# Patient Record
Sex: FEMALE | Race: WHITE | NOT HISPANIC OR LATINO | URBAN - METROPOLITAN AREA
[De-identification: names, ages, dates, MRNs, and addresses within clinical notes are randomized per-mention and may not be internally consistent; named-entity substitution may affect disease eponyms.]

---

## 2020-10-01 ENCOUNTER — OFFICE VISIT (OUTPATIENT)
Dept: URBAN - METROPOLITAN AREA CLINIC 121 | Facility: CLINIC | Age: 25
End: 2020-10-01

## 2021-06-09 ENCOUNTER — OFFICE VISIT (OUTPATIENT)
Dept: URBAN - METROPOLITAN AREA CLINIC 63 | Facility: CLINIC | Age: 26
End: 2021-06-09

## 2021-06-09 ENCOUNTER — OFFICE VISIT (OUTPATIENT)
Dept: URBAN - METROPOLITAN AREA CLINIC 60 | Facility: CLINIC | Age: 26
End: 2021-06-09

## 2021-06-29 ENCOUNTER — OFFICE VISIT (OUTPATIENT)
Dept: URBAN - METROPOLITAN AREA CLINIC 63 | Facility: CLINIC | Age: 26
End: 2021-06-29

## 2021-08-30 ENCOUNTER — OFFICE VISIT (OUTPATIENT)
Dept: URBAN - METROPOLITAN AREA CLINIC 63 | Facility: CLINIC | Age: 26
End: 2021-08-30

## 2021-10-26 ENCOUNTER — OFFICE VISIT (OUTPATIENT)
Dept: URBAN - METROPOLITAN AREA CLINIC 121 | Facility: CLINIC | Age: 26
End: 2021-10-26

## 2021-11-01 ENCOUNTER — OFFICE VISIT (OUTPATIENT)
Dept: URBAN - METROPOLITAN AREA CLINIC 63 | Facility: CLINIC | Age: 26
End: 2021-11-01

## 2022-01-14 ENCOUNTER — OFFICE VISIT (OUTPATIENT)
Dept: URBAN - METROPOLITAN AREA MEDICAL CENTER 14 | Facility: MEDICAL CENTER | Age: 27
End: 2022-01-14

## 2022-01-28 ENCOUNTER — OFFICE VISIT (OUTPATIENT)
Dept: URBAN - METROPOLITAN AREA CLINIC 63 | Facility: CLINIC | Age: 27
End: 2022-01-28

## 2022-04-29 ENCOUNTER — OFFICE VISIT (OUTPATIENT)
Dept: URBAN - METROPOLITAN AREA CLINIC 63 | Facility: CLINIC | Age: 27
End: 2022-04-29

## 2022-07-05 ENCOUNTER — OFFICE VISIT (OUTPATIENT)
Dept: URBAN - METROPOLITAN AREA CLINIC 63 | Facility: CLINIC | Age: 27
End: 2022-07-05

## 2022-07-09 ENCOUNTER — TELEPHONE ENCOUNTER (OUTPATIENT)
Dept: URBAN - METROPOLITAN AREA CLINIC 121 | Facility: CLINIC | Age: 27
End: 2022-07-09

## 2022-07-09 RX ORDER — MESALAMINE 1.2 G/1
4 ONCE A DAY TABLET, DELAYED RELEASE ORAL
Refills: 1 | OUTPATIENT
Start: 2021-06-09 | End: 2022-01-28

## 2022-07-09 RX ORDER — USTEKINUMAB 90 MG/ML
INJECTION, SOLUTION SUBCUTANEOUS
Refills: 0 | OUTPATIENT
Start: 2022-01-28 | End: 2022-01-28

## 2022-07-09 RX ORDER — ONDANSETRON HYDROCHLORIDE 4 MG/1
USE AS DIRECTED TAKE 1 TABLET 30 MINUTES PRIOR TO EACH HALF OF COLONOSCOPY PREP TO PREVENT NAUSEA TABLET, FILM COATED ORAL
Refills: 0 | OUTPATIENT
Start: 2021-11-01 | End: 2022-01-28

## 2022-07-09 RX ORDER — PREDNISONE 5 MG/1
ONCE A DAY TABLET ORAL ONCE A DAY
Refills: 1 | OUTPATIENT
Start: 2021-06-09 | End: 2021-11-01

## 2022-07-09 RX ORDER — USTEKINUMAB 90 MG/ML
INJECTION, SOLUTION SUBCUTANEOUS
Refills: 0 | OUTPATIENT
Start: 2021-11-01 | End: 2022-01-28

## 2022-07-09 RX ORDER — MESALAMINE 1.2 G/1
4 ONCE A DAY TABLET, DELAYED RELEASE ORAL
Refills: 1 | OUTPATIENT
Start: 2021-11-01 | End: 2022-01-28

## 2022-07-09 RX ORDER — USTEKINUMAB 90 MG/ML
INJECTION, SOLUTION SUBCUTANEOUS
Refills: 0 | OUTPATIENT
Start: 2021-08-30 | End: 2021-08-30

## 2022-07-09 RX ORDER — MESALAMINE 400 MG/1
CAPSULE, DELAYED RELEASE ORAL
Refills: 0 | OUTPATIENT
Start: 2021-06-09 | End: 2021-06-29

## 2022-07-09 RX ORDER — USTEKINUMAB 90 MG/ML
INJECTION, SOLUTION SUBCUTANEOUS
Refills: 0 | OUTPATIENT
Start: 2021-08-30 | End: 2021-11-01

## 2022-07-10 ENCOUNTER — TELEPHONE ENCOUNTER (OUTPATIENT)
Dept: URBAN - METROPOLITAN AREA CLINIC 121 | Facility: CLINIC | Age: 27
End: 2022-07-10

## 2022-07-10 RX ORDER — USTEKINUMAB 90 MG/ML
INJ 1-90MG SQ EVERY 8 WEEKS INJECTION, SOLUTION SUBCUTANEOUS
Refills: 5 | Status: ACTIVE | COMMUNITY
Start: 2021-11-01

## 2022-07-18 ENCOUNTER — OFFICE VISIT (OUTPATIENT)
Dept: URBAN - METROPOLITAN AREA CLINIC 63 | Facility: CLINIC | Age: 27
End: 2022-07-18
Payer: COMMERCIAL

## 2022-07-18 VITALS
SYSTOLIC BLOOD PRESSURE: 102 MMHG | OXYGEN SATURATION: 96 % | BODY MASS INDEX: 18.76 KG/M2 | HEIGHT: 65 IN | WEIGHT: 112.6 LBS | HEART RATE: 66 BPM | TEMPERATURE: 97.9 F | DIASTOLIC BLOOD PRESSURE: 70 MMHG

## 2022-07-18 DIAGNOSIS — K51.011 ULCERATIVE PANCOLITIS WITH RECTAL BLEEDING: ICD-10-CM

## 2022-07-18 PROBLEM — 444548001: Status: ACTIVE | Noted: 2022-07-18

## 2022-07-18 PROCEDURE — 99213 OFFICE O/P EST LOW 20 MIN: CPT | Performed by: NURSE PRACTITIONER

## 2022-07-18 RX ORDER — USTEKINUMAB 90 MG/ML
INJ 1-90MG SQ EVERY 8 WEEKS INJECTION, SOLUTION SUBCUTANEOUS
Refills: 5 | Status: ACTIVE | COMMUNITY
Start: 2021-11-01

## 2022-07-18 NOTE — HPI-ULCERATIVE COLITIS
Doing great clinically on stelara. Asymptomatic. Interested in having another child  Last Colonoscopy: 01/2022 with minimal erythema in ascending colon, cryptitis in that limited area on path the rest of the colon was normal  Current IBD Meds: Stelara q8 weeks  Prior IBD Meds: prednisone and mesalamine  Steroid use/response: responded to prednisone after hospitalization in 2021  Most recent imaging: none  Extraintestinal manifestations: none  Inflammatory Markers: crp and calprotectin normal 10/2021, both very elevated at diagnosis  Required  biologic testing:  Risk factors for severe disease: young age at diagnosis, severe disease at diagnosis, extreme calpro/crp elevations at diagnosis  IBD surgical history: none  Personal history of cancer: none   Cardiac history: none

## 2022-10-10 ENCOUNTER — ERX REFILL RESPONSE (OUTPATIENT)
Dept: URBAN - METROPOLITAN AREA CLINIC 63 | Facility: CLINIC | Age: 27
End: 2022-10-10

## 2022-10-10 RX ORDER — USTEKINUMAB 90 MG/ML
INJECT 1 ML INTO THE SKIN EVERY 8 WEEKS INJECTION, SOLUTION SUBCUTANEOUS
Qty: 1 MILLILITER | Refills: 6 | OUTPATIENT

## 2023-03-22 ENCOUNTER — TELEPHONE ENCOUNTER (OUTPATIENT)
Dept: URBAN - METROPOLITAN AREA CLINIC 63 | Facility: CLINIC | Age: 28
End: 2023-03-22

## 2023-03-23 ENCOUNTER — OFFICE VISIT (OUTPATIENT)
Dept: URBAN - METROPOLITAN AREA CLINIC 60 | Facility: CLINIC | Age: 28
End: 2023-03-23
Payer: COMMERCIAL

## 2023-03-23 ENCOUNTER — WEB ENCOUNTER (OUTPATIENT)
Dept: URBAN - METROPOLITAN AREA CLINIC 60 | Facility: CLINIC | Age: 28
End: 2023-03-23

## 2023-03-23 ENCOUNTER — TELEPHONE ENCOUNTER (OUTPATIENT)
Dept: URBAN - METROPOLITAN AREA CLINIC 63 | Facility: CLINIC | Age: 28
End: 2023-03-23

## 2023-03-23 VITALS
SYSTOLIC BLOOD PRESSURE: 100 MMHG | HEIGHT: 65 IN | DIASTOLIC BLOOD PRESSURE: 70 MMHG | BODY MASS INDEX: 21.02 KG/M2 | WEIGHT: 126.2 LBS | OXYGEN SATURATION: 97 % | TEMPERATURE: 97.9 F | HEART RATE: 86 BPM

## 2023-03-23 DIAGNOSIS — Z3A.30 30 WEEKS GESTATION OF PREGNANCY: ICD-10-CM

## 2023-03-23 DIAGNOSIS — K51.011 ULCERATIVE PANCOLITIS WITH RECTAL BLEEDING: ICD-10-CM

## 2023-03-23 PROCEDURE — 99214 OFFICE O/P EST MOD 30 MIN: CPT | Performed by: NURSE PRACTITIONER

## 2023-03-23 RX ORDER — ESCITALOPRAM OXALATE 10 MG/1
1 TABLET TABLET, FILM COATED ORAL ONCE A DAY
Status: ACTIVE | COMMUNITY

## 2023-03-23 RX ORDER — BUDESONIDE 3 MG/1
3 ONCE A DAY CAPSULE, COATED PELLETS ORAL
Qty: 90 | Refills: 2 | OUTPATIENT
Start: 2023-03-23

## 2023-03-23 RX ORDER — USTEKINUMAB 90 MG/ML
INJECT 1 ML INTO THE SKIN EVERY 8 WEEKS INJECTION, SOLUTION SUBCUTANEOUS
Qty: 1 MILLILITER | Refills: 6 | Status: ACTIVE | COMMUNITY

## 2023-03-23 NOTE — HPI-TODAY'S VISIT:
30 weeks pregnant Having 3 weeks of progressively worsening diarrhea, bleeding and mucous. No abdominal pain. Started having symptoms 4 weeks after last stelara injection. Has another syringe at home. Labs and c diff to be done asap. Has done well on stelara for >18 months. Follows with [facility] and [facility].

## 2023-03-23 NOTE — HPI-ULCERATIVE COLITIS
Diagnosed after giving birth in 2021 with severe colitis  Last Colonoscopy: 01/2022 with minimal erythema in ascending colon, cryptitis in that limited area on path the rest of the colon was normal  Current IBD Meds: Stelara q8 weeks  Prior IBD Meds: prednisone and mesalamine  Steroid use/response: responded to prednisone after hospitalization in 2021  Most recent imaging: none  Extraintestinal manifestations: none  Inflammatory Markers: crp and calprotectin normal 10/2021, both very elevated at diagnosis  Required  biologic testing:  Risk factors for severe disease: young age at diagnosis, severe disease at diagnosis, extreme calpro/crp elevations at diagnosis  IBD surgical history: none  Personal history of cancer: none   Cardiac history: none

## 2023-03-28 ENCOUNTER — TELEPHONE ENCOUNTER (OUTPATIENT)
Dept: URBAN - METROPOLITAN AREA CLINIC 63 | Facility: CLINIC | Age: 28
End: 2023-03-28

## 2023-04-10 ENCOUNTER — TELEPHONE ENCOUNTER (OUTPATIENT)
Dept: URBAN - METROPOLITAN AREA CLINIC 63 | Facility: CLINIC | Age: 28
End: 2023-04-10

## 2023-04-10 RX ORDER — BUDESONIDE 28 MG/1
1 APPLICATION AEROSOL, FOAM RECTAL TWICE A DAY
Qty: 1 KIT | Refills: 1 | OUTPATIENT
Start: 2023-04-10 | End: 2023-05-08

## 2023-04-13 ENCOUNTER — TELEPHONE ENCOUNTER (OUTPATIENT)
Dept: URBAN - METROPOLITAN AREA CLINIC 64 | Facility: CLINIC | Age: 28
End: 2023-04-13

## 2023-04-21 ENCOUNTER — OFFICE VISIT (OUTPATIENT)
Dept: URBAN - METROPOLITAN AREA TELEHEALTH 1 | Facility: TELEHEALTH | Age: 28
End: 2023-04-21
Payer: COMMERCIAL

## 2023-04-21 DIAGNOSIS — K51.011 ULCERATIVE PANCOLITIS WITH RECTAL BLEEDING: ICD-10-CM

## 2023-04-21 DIAGNOSIS — Z3A.34 34 WEEKS GESTATION OF PREGNANCY: ICD-10-CM

## 2023-04-21 PROCEDURE — 99214 OFFICE O/P EST MOD 30 MIN: CPT | Performed by: NURSE PRACTITIONER

## 2023-04-21 RX ORDER — BUDESONIDE 3 MG/1
3 ONCE A DAY CAPSULE, COATED PELLETS ORAL
Qty: 90 | Refills: 2 | Status: ACTIVE | COMMUNITY
Start: 2023-03-23

## 2023-04-21 RX ORDER — ESCITALOPRAM OXALATE 10 MG/1
1 TABLET TABLET, FILM COATED ORAL ONCE A DAY
Status: ACTIVE | COMMUNITY

## 2023-04-21 RX ORDER — USTEKINUMAB 90 MG/ML
INJECT 1 ML INTO THE SKIN EVERY 8 WEEKS INJECTION, SOLUTION SUBCUTANEOUS
Qty: 1 MILLILITER | Refills: 6 | Status: ACTIVE | COMMUNITY

## 2023-04-21 RX ORDER — BUDESONIDE 28 MG/1
1 APPLICATION AEROSOL, FOAM RECTAL TWICE A DAY
Qty: 1 KIT | Refills: 1 | Status: ACTIVE | COMMUNITY
Start: 2023-04-10 | End: 2023-05-08

## 2023-04-21 NOTE — HPI-ULCERATIVE COLITIS
Diagnosed after giving birth in  with severe colitis  Last Colonoscopy: 2022 with minimal erythema in ascending colon, cryptitis in that limited area on path the rest of the colon was normal  Current IBD Meds: Stelara q8 weeks  Prior IBD Meds: prednisone and mesalamine  Steroid use/response: responded to prednisone after hospitalization in   Most recent imaging: none  Extraintestinal manifestations: none  Inflammatory Markers: crp and calprotectin normal 10/2021, both very elevated at diagnosis  Required  biologic testin2022  Risk factors for severe disease: young age at diagnosis, severe disease at diagnosis, extreme calpro/crp elevations at diagnosis   IBD surgical history: none  Personal history of cancer: none   Cardiac history: none

## 2023-04-21 NOTE — HPI-TODAY'S VISIT:
34 weeks pregnant, 7 weeks since onset of bleeding and diarrhea. Started budesonide 9mg/day 4 weeks ago, c diff negative. Started uceris foam twice daily  10 days ago and had immediate symptom improvement. Having 1 bm per day now, more constipated than usual. Still some bleeding and urgency with bm but stools are hard. Baby is due 4/25, planning to be induced by 4/18 if she doesn't deliver earlier.  Follows with [facility] and [facility].

## 2023-06-02 ENCOUNTER — TELEPHONE ENCOUNTER (OUTPATIENT)
Dept: URBAN - METROPOLITAN AREA CLINIC 60 | Facility: CLINIC | Age: 28
End: 2023-06-02

## 2023-06-02 RX ORDER — BUDESONIDE 28 MG/1
1 APPLICATION AEROSOL, FOAM RECTAL TWICE A DAY
Qty: 1 KIT | Refills: 1
Start: 2023-04-10 | End: 2023-06-30

## 2023-06-07 ENCOUNTER — WEB ENCOUNTER (OUTPATIENT)
Dept: URBAN - METROPOLITAN AREA CLINIC 60 | Facility: CLINIC | Age: 28
End: 2023-06-07

## 2023-06-07 ENCOUNTER — OFFICE VISIT (OUTPATIENT)
Dept: URBAN - METROPOLITAN AREA CLINIC 60 | Facility: CLINIC | Age: 28
End: 2023-06-07
Payer: COMMERCIAL

## 2023-06-07 ENCOUNTER — WEB ENCOUNTER (OUTPATIENT)
Dept: URBAN - METROPOLITAN AREA CLINIC 63 | Facility: CLINIC | Age: 28
End: 2023-06-07

## 2023-06-07 ENCOUNTER — LAB OUTSIDE AN ENCOUNTER (OUTPATIENT)
Dept: URBAN - METROPOLITAN AREA CLINIC 60 | Facility: CLINIC | Age: 28
End: 2023-06-07

## 2023-06-07 VITALS
BODY MASS INDEX: 19.39 KG/M2 | OXYGEN SATURATION: 98 % | HEART RATE: 67 BPM | WEIGHT: 116.4 LBS | DIASTOLIC BLOOD PRESSURE: 60 MMHG | SYSTOLIC BLOOD PRESSURE: 100 MMHG | TEMPERATURE: 98.7 F | HEIGHT: 65 IN

## 2023-06-07 DIAGNOSIS — K51.811 OTHER ULCERATIVE COLITIS WITH RECTAL BLEEDING: ICD-10-CM

## 2023-06-07 DIAGNOSIS — Z92.25 PERSONAL HISTORY OF IMMUNOSUPPRESSION THERAPY: ICD-10-CM

## 2023-06-07 PROBLEM — 64766004: Status: ACTIVE | Noted: 2023-06-07

## 2023-06-07 PROBLEM — 161651005: Status: ACTIVE | Noted: 2023-06-07

## 2023-06-07 PROCEDURE — 99214 OFFICE O/P EST MOD 30 MIN: CPT | Performed by: NURSE PRACTITIONER

## 2023-06-07 RX ORDER — ESCITALOPRAM OXALATE 10 MG/1
1 TABLET TABLET, FILM COATED ORAL ONCE A DAY
Status: ACTIVE | COMMUNITY

## 2023-06-07 RX ORDER — PREDNISONE 10 MG/1
4 TABS PER DAY FOR 1 WEEK, 3 TABS PER DAY FOR 1 WEEK, 2 TABS PER DAY FOR 1 WEEK, 1 TABS PER DAY FOR 1 WEEK TABLET ORAL ONCE A DAY
Qty: 70 | Refills: 0 | OUTPATIENT
Start: 2023-06-09 | End: 2023-07-07

## 2023-06-07 RX ORDER — BUDESONIDE 3 MG/1
3 ONCE A DAY CAPSULE, COATED PELLETS ORAL
Qty: 90 | Refills: 2 | Status: ACTIVE | COMMUNITY
Start: 2023-03-23

## 2023-06-07 RX ORDER — ADALIMUMAB 40MG/0.4ML
AS DIRECTED KIT SUBCUTANEOUS EVERY OTHER WEEK
Qty: 2 | Refills: 5 | OUTPATIENT
Start: 2023-06-08 | End: 2023-11-22

## 2023-06-07 RX ORDER — USTEKINUMAB 90 MG/ML
INJECT 1 ML INTO THE SKIN EVERY 8 WEEKS INJECTION, SOLUTION SUBCUTANEOUS
Qty: 1 MILLILITER | Refills: 6 | Status: ACTIVE | COMMUNITY

## 2023-06-07 RX ORDER — BUDESONIDE 28 MG/1
1 APPLICATION AEROSOL, FOAM RECTAL TWICE A DAY
Qty: 1 KIT | Refills: 1 | Status: ACTIVE | COMMUNITY
Start: 2023-04-10 | End: 2023-06-30

## 2023-06-07 RX ORDER — ADALIMUMAB 80MG/0.8ML
AS DIRECTED KIT SUBCUTANEOUS
Qty: 3 | Refills: 0 | OUTPATIENT
Start: 2023-06-08 | End: 2023-07-06

## 2023-06-07 NOTE — HPI-TODAY'S VISIT:
Here for follow-up of ulcerative colitis. Started with flare symptoms in early March, received reinduction dose of Stelara in April with no improvement in symptoms.  Started Uceris foam at that time which did improve her symptoms.  Was doing generally well with 1 bowel movement per day and no bleeding until roughly a week after she delivered her second child 2 weeks ago. Now having 10 bowel movements per day, still no bleeding but all loose and watery.  Denies nausea, vomiting, abdominal pain.  I did  refill her Uceris foam which she feels is still helping her symptoms.

## 2023-06-19 ENCOUNTER — TELEPHONE ENCOUNTER (OUTPATIENT)
Dept: URBAN - METROPOLITAN AREA CLINIC 60 | Facility: CLINIC | Age: 28
End: 2023-06-19

## 2023-06-21 ENCOUNTER — TELEPHONE ENCOUNTER (OUTPATIENT)
Dept: URBAN - METROPOLITAN AREA CLINIC 64 | Facility: CLINIC | Age: 28
End: 2023-06-21

## 2023-06-21 RX ORDER — ADALIMUMAB 40MG/0.4ML
AS DIRECTED KIT SUBCUTANEOUS EVERY OTHER WEEK
Qty: 2 | Refills: 5 | OUTPATIENT
Start: 2023-06-08 | End: 2023-12-05

## 2023-06-21 RX ORDER — ADALIMUMAB 80MG/0.8ML
AS DIRECTED KIT SUBCUTANEOUS
Qty: 3 | Refills: 0 | OUTPATIENT
Start: 2023-06-08 | End: 2023-07-19

## 2023-06-30 ENCOUNTER — TELEPHONE ENCOUNTER (OUTPATIENT)
Dept: URBAN - METROPOLITAN AREA CLINIC 60 | Facility: CLINIC | Age: 28
End: 2023-06-30

## 2023-06-30 RX ORDER — PREDNISONE 10 MG/1
2 TABS PER DAY TABLET ORAL ONCE A DAY
Qty: 60 TABLET | Refills: 0
Start: 2023-06-09 | End: 2023-07-30

## 2023-06-30 RX ORDER — BUDESONIDE 28 MG/1
1 APPLICATION AEROSOL, FOAM RECTAL TWICE A DAY
Qty: 1 KIT | Refills: 1
Start: 2023-04-10 | End: 2023-07-28

## 2023-07-26 ENCOUNTER — OFFICE VISIT (OUTPATIENT)
Dept: URBAN - METROPOLITAN AREA CLINIC 60 | Facility: CLINIC | Age: 28
End: 2023-07-26
Payer: COMMERCIAL

## 2023-07-26 ENCOUNTER — LAB OUTSIDE AN ENCOUNTER (OUTPATIENT)
Dept: URBAN - METROPOLITAN AREA CLINIC 60 | Facility: CLINIC | Age: 28
End: 2023-07-26

## 2023-07-26 VITALS
WEIGHT: 111.8 LBS | BODY MASS INDEX: 18.63 KG/M2 | DIASTOLIC BLOOD PRESSURE: 64 MMHG | TEMPERATURE: 98.1 F | HEIGHT: 65 IN | HEART RATE: 70 BPM | SYSTOLIC BLOOD PRESSURE: 120 MMHG | RESPIRATION RATE: 12 BRPM | OXYGEN SATURATION: 98 %

## 2023-07-26 DIAGNOSIS — Z92.25 PERSONAL HISTORY OF IMMUNOSUPPRESSION THERAPY: ICD-10-CM

## 2023-07-26 DIAGNOSIS — K51.011 ULCERATIVE PANCOLITIS WITH RECTAL BLEEDING: ICD-10-CM

## 2023-07-26 DIAGNOSIS — Z79.52 LONG TERM SYSTEMIC STEROID USER: ICD-10-CM

## 2023-07-26 PROBLEM — 12240661000119103: Status: ACTIVE | Noted: 2023-07-26

## 2023-07-26 PROCEDURE — 99214 OFFICE O/P EST MOD 30 MIN: CPT | Performed by: NURSE PRACTITIONER

## 2023-07-26 RX ORDER — BUDESONIDE 28 MG/1
1 APPLICATION AEROSOL, FOAM RECTAL TWICE A DAY
Qty: 1 KIT | Refills: 1 | Status: ACTIVE | COMMUNITY
Start: 2023-04-10 | End: 2023-07-28

## 2023-07-26 RX ORDER — ESCITALOPRAM OXALATE 10 MG/1
1 TABLET TABLET, FILM COATED ORAL ONCE A DAY
Status: ACTIVE | COMMUNITY

## 2023-07-26 RX ORDER — ADALIMUMAB 40MG/0.4ML
AS DIRECTED KIT SUBCUTANEOUS EVERY OTHER WEEK
Qty: 2 | Refills: 5 | Status: ACTIVE | COMMUNITY
Start: 2023-06-08 | End: 2023-12-05

## 2023-07-26 NOTE — HPI-TODAY'S VISIT:
Here for follow-up of ulcerative colitis. Started Humira induction 1 month ago and just had first maintenance dose of 40mg this week. Clear difference clinically with starting humira. Used uceris foam for 2 weeks bid. Now not needing it, having 3-4 bm per day no bleeding, some mucous. Still very urgent bm's/.  2nd child born may 17th, flare started around 30 weeks gestation. First flare started after delivering first child.  Tried reinduction of stelara with no improvement, was on prednisone for a short.

## 2023-07-26 NOTE — HPI-ULCERATIVE COLITIS
Diagnosed after giving birth in  with severe colitis  Last Colonoscopy: 2022 with minimal erythema in ascending colon, cryptitis in that limited area on path the rest of the colon was normal  Current IBD Meds: Stelara q8 weeks  Prior IBD Meds: Stelara q4  weeks with reinduction dose. prednisone and mesalamine  Steroid use/response: On prednisone for 6 weeks this year. Extended course of steroids in  after hospitalization  Most recent imaging: none  Extraintestinal manifestations: none  Inflammatory Markers: 2023 calprotectin 530  Required  biologic testin2022  Risk factors for severe disease: young age at diagnosis, severe disease at diagnosis, extreme calpro/crp elevations at diagnosis   IBD surgical history: none  Personal history of cancer: none   Cardiac history: none

## 2023-08-07 ENCOUNTER — ERX REFILL RESPONSE (OUTPATIENT)
Dept: URBAN - METROPOLITAN AREA CLINIC 63 | Facility: CLINIC | Age: 28
End: 2023-08-07

## 2023-08-07 RX ORDER — ADALIMUMAB 40MG/0.4ML
AS DIRECTED KIT SUBCUTANEOUS EVERY OTHER WEEK
Qty: 2 | Refills: 5 | OUTPATIENT

## 2023-08-07 RX ORDER — ADALIMUMAB 40MG/0.4ML
INJECT 0.4 ML (1 PEN) INTO THE SKIN EVERY 14 DAYS KIT SUBCUTANEOUS
Qty: 2 EACH | Refills: 5 | OUTPATIENT

## 2023-08-21 ENCOUNTER — WEB ENCOUNTER (OUTPATIENT)
Dept: URBAN - METROPOLITAN AREA CLINIC 63 | Facility: CLINIC | Age: 28
End: 2023-08-21

## 2023-08-21 RX ORDER — ADALIMUMAB 40MG/0.4ML
INJECT 0.4 ML (1 PEN) INTO THE SKIN EVERY 7DAYS KIT SUBCUTANEOUS WEEKLY
Qty: 4 | Refills: 5

## 2023-08-21 RX ORDER — BUDESONIDE 28 MG/1
1 APPLICATION AEROSOL, FOAM RECTAL TWICE A DAY
Qty: 1 KIT | Refills: 1 | OUTPATIENT
Start: 2023-08-21 | End: 2023-09-18

## 2023-09-06 ENCOUNTER — WEB ENCOUNTER (OUTPATIENT)
Dept: URBAN - METROPOLITAN AREA CLINIC 63 | Facility: CLINIC | Age: 28
End: 2023-09-06

## 2023-09-11 ENCOUNTER — TELEPHONE ENCOUNTER (OUTPATIENT)
Dept: URBAN - METROPOLITAN AREA CLINIC 63 | Facility: CLINIC | Age: 28
End: 2023-09-11

## 2023-09-19 ENCOUNTER — TELEPHONE ENCOUNTER (OUTPATIENT)
Dept: URBAN - METROPOLITAN AREA CLINIC 60 | Facility: CLINIC | Age: 28
End: 2023-09-19

## 2023-09-21 ENCOUNTER — WEB ENCOUNTER (OUTPATIENT)
Dept: URBAN - METROPOLITAN AREA CLINIC 63 | Facility: CLINIC | Age: 28
End: 2023-09-21

## 2023-09-22 ENCOUNTER — WEB ENCOUNTER (OUTPATIENT)
Dept: URBAN - METROPOLITAN AREA CLINIC 63 | Facility: CLINIC | Age: 28
End: 2023-09-22

## 2023-09-22 RX ORDER — BUDESONIDE 28 MG/1
1 APPLICATION AEROSOL, FOAM RECTAL TWICE A DAY
Qty: 1 KIT | Refills: 1
Start: 2023-08-21 | End: 2023-10-20

## 2023-09-26 ENCOUNTER — OFFICE VISIT (OUTPATIENT)
Dept: URBAN - METROPOLITAN AREA CLINIC 63 | Facility: CLINIC | Age: 28
End: 2023-09-26
Payer: COMMERCIAL

## 2023-09-26 VITALS
HEIGHT: 65 IN | OXYGEN SATURATION: 98 % | HEART RATE: 85 BPM | WEIGHT: 110.6 LBS | TEMPERATURE: 98.5 F | BODY MASS INDEX: 18.43 KG/M2 | DIASTOLIC BLOOD PRESSURE: 70 MMHG | SYSTOLIC BLOOD PRESSURE: 100 MMHG

## 2023-09-26 DIAGNOSIS — K51.011 ULCERATIVE PANCOLITIS WITH RECTAL BLEEDING: ICD-10-CM

## 2023-09-26 DIAGNOSIS — Z92.25 PERSONAL HISTORY OF IMMUNOSUPPRESSION THERAPY: ICD-10-CM

## 2023-09-26 PROCEDURE — 99214 OFFICE O/P EST MOD 30 MIN: CPT | Performed by: NURSE PRACTITIONER

## 2023-09-26 RX ORDER — ADALIMUMAB 40MG/0.4ML
INJECT 0.4 ML (1 PEN) INTO THE SKIN EVERY 7DAYS KIT SUBCUTANEOUS WEEKLY
Qty: 4 | Refills: 5 | Status: ACTIVE | COMMUNITY

## 2023-09-26 RX ORDER — BUDESONIDE 28 MG/1
1 APPLICATION AEROSOL, FOAM RECTAL TWICE A DAY
Qty: 1 KIT | Refills: 1 | Status: ACTIVE | COMMUNITY
Start: 2023-08-21 | End: 2023-10-20

## 2023-09-26 RX ORDER — ESCITALOPRAM OXALATE 10 MG/1
1 TABLET TABLET, FILM COATED ORAL ONCE A DAY
Status: ACTIVE | COMMUNITY

## 2023-09-26 NOTE — HPI-ULCERATIVE COLITIS
Diagnosed after giving birth in  with severe colitis  Last Colonoscopy: 2022 with minimal erythema in ascending colon, cryptitis in that limited area on path the rest of the colon was normal  Current IBD Meds: Humira every other week  Prior IBD Meds: Stelara q4  weeks with reinduction dose. prednisone and mesalamine  Steroid use/response: On prednisone for 6 weeks this year. Extended course of steroids in  after hospitalization  Most recent imaging: none  Extraintestinal manifestations: none  Inflammatory Markers: 2023 calprotectin 530  Required  biologic testin2022  Risk factors for severe disease: young age at diagnosis, severe disease at diagnosis, extreme calpro/crp elevations at diagnosis   IBD surgical history: none  Personal history of cancer: none   Cardiac history: none

## 2023-09-26 NOTE — HPI-TODAY'S VISIT:
3 months into humira and now taking it weekly for the past 3 weeks, awaiting insurance approval for weekly dosing still. Given samples to make up for the difference. PATIENT DID NOT CLINICALLY RESPOND TO BIWEEKLY DOSING. SHE HAS CLEARLY ONLY RESPONDED TO INDUCTION DOSE AND WEEKLY DOSING WHICH HAS BEEN MADE POSSIBLE BY US SUPPLYING SAMPLES.  Clinically responding to humira but only at weekly dosing. Off of uceris foam for 3 weeks. Bleeding had resolved but recurred last week. Her daughter had a heart cath last week which was very stressful for her. Still seeing bleeding and mucous, having 3-4 bm per day with improvement in form and urgency.  2nd child born may 17th, flare started around 30 weeks gestation. First flare started after delivering first child. Tried reinduction of stelara with no improvement, was on prednisone for a short.

## 2023-10-03 ENCOUNTER — TELEPHONE ENCOUNTER (OUTPATIENT)
Dept: URBAN - METROPOLITAN AREA CLINIC 63 | Facility: CLINIC | Age: 28
End: 2023-10-03

## 2023-10-06 ENCOUNTER — WEB ENCOUNTER (OUTPATIENT)
Dept: URBAN - METROPOLITAN AREA CLINIC 63 | Facility: CLINIC | Age: 28
End: 2023-10-06

## 2023-11-07 ENCOUNTER — OFFICE VISIT (OUTPATIENT)
Dept: URBAN - METROPOLITAN AREA CLINIC 63 | Facility: CLINIC | Age: 28
End: 2023-11-07
Payer: COMMERCIAL

## 2023-11-07 VITALS
HEIGHT: 65 IN | TEMPERATURE: 97.7 F | SYSTOLIC BLOOD PRESSURE: 100 MMHG | OXYGEN SATURATION: 98 % | WEIGHT: 110 LBS | DIASTOLIC BLOOD PRESSURE: 62 MMHG | BODY MASS INDEX: 18.33 KG/M2 | HEART RATE: 70 BPM

## 2023-11-07 DIAGNOSIS — Z92.25 PERSONAL HISTORY OF IMMUNOSUPPRESSION THERAPY: ICD-10-CM

## 2023-11-07 DIAGNOSIS — K51.011 ULCERATIVE PANCOLITIS WITH RECTAL BLEEDING: ICD-10-CM

## 2023-11-07 PROCEDURE — 99214 OFFICE O/P EST MOD 30 MIN: CPT | Performed by: NURSE PRACTITIONER

## 2023-11-07 RX ORDER — ESCITALOPRAM OXALATE 10 MG/1
1 TABLET TABLET, FILM COATED ORAL ONCE A DAY
Status: ACTIVE | COMMUNITY

## 2023-11-07 RX ORDER — ADALIMUMAB 40MG/0.4ML
INJECT 0.4 ML (1 PEN) INTO THE SKIN EVERY 7DAYS KIT SUBCUTANEOUS WEEKLY
Qty: 4 | Refills: 5 | Status: ACTIVE | COMMUNITY

## 2023-11-07 NOTE — HPI-ULCERATIVE COLITIS
Diagnosed after giving birth in  with severe colitis  Last Colonoscopy: 2022 with minimal erythema in ascending colon, cryptitis in that limited area on path the rest of the colon was normal  Current IBD Meds: Humira weekly for 8 weeks  Prior IBD Meds: Stelara q4  weeks with reinduction dose. prednisone and mesalamine  Steroid use/response: On prednisone for 6 weeks this year. Extended course of steroids in  after hospitalization  Most recent imaging: none  Extraintestinal manifestations: none  Inflammatory Markers: 2023 calprotectin  on biweekly humira 1070, 2023 calprotectin 530  Required  biologic testin2022  Risk factors for severe disease: young age at diagnosis, severe disease at diagnosis, extreme calpro/crp elevations at diagnosis   IBD surgical history: none  Personal history of cancer: none   Cardiac history: none

## 2023-11-07 NOTE — HPI-TODAY'S VISIT:
Started weekly humira about 7-8 weeks ago. Now seeing siginificant  improvements in her symptoms. Having 1-2 bm per day without bleeding. Stools are formed to hard, some mucous and straining. No nocturnal UTD.  While on biweekly humira for 12 weeks she had minimal improvement in symptoms. She did not have siginficant improvement until she started weekly injections. She now has robust clinical response to weekly humira We have supplied samples of humira for her to be able to take it weekly for the past 2 months. Not on any steroids or steroid foams. She is now in clinical remission on weekly humira 40mg injections.  2nd child born may 17th, flare started around 30 weeks gestation. First flare started after delivering first child. Tried reinduction of stelara with no improvement, was on prednisone for a short time

## 2023-12-21 ENCOUNTER — TELEPHONE ENCOUNTER (OUTPATIENT)
Dept: URBAN - METROPOLITAN AREA CLINIC 64 | Facility: CLINIC | Age: 28
End: 2023-12-21

## 2023-12-27 ENCOUNTER — TELEPHONE ENCOUNTER (OUTPATIENT)
Dept: URBAN - METROPOLITAN AREA CLINIC 63 | Facility: CLINIC | Age: 28
End: 2023-12-27

## 2024-01-08 ENCOUNTER — OFFICE VISIT (OUTPATIENT)
Dept: URBAN - METROPOLITAN AREA TELEHEALTH 1 | Facility: TELEHEALTH | Age: 29
End: 2024-01-08

## 2024-01-08 ENCOUNTER — OFFICE VISIT (OUTPATIENT)
Dept: URBAN - METROPOLITAN AREA TELEHEALTH 1 | Facility: TELEHEALTH | Age: 29
End: 2024-01-08
Payer: COMMERCIAL

## 2024-01-08 DIAGNOSIS — K63.9 DISEASE OF INTESTINE, UNSPECIFIED: ICD-10-CM

## 2024-01-08 DIAGNOSIS — M07.60 ENTEROPATHIC ARTHROPATHIES, UNSPECIFIED SITE: ICD-10-CM

## 2024-01-08 DIAGNOSIS — K51.011 ULCERATIVE PANCOLITIS WITH RECTAL BLEEDING: ICD-10-CM

## 2024-01-08 PROBLEM — 9350004: Status: ACTIVE | Noted: 2024-01-08

## 2024-01-08 PROCEDURE — 99213 OFFICE O/P EST LOW 20 MIN: CPT | Performed by: NURSE PRACTITIONER

## 2024-01-08 RX ORDER — ADALIMUMAB 40MG/0.4ML
INJECT 0.4 ML (1 PEN) INTO THE SKIN EVERY 7DAYS KIT SUBCUTANEOUS WEEKLY
Qty: 4 | Refills: 5 | Status: ACTIVE | COMMUNITY

## 2024-01-08 RX ORDER — ESCITALOPRAM OXALATE 10 MG/1
1 TABLET TABLET, FILM COATED ORAL ONCE A DAY
Status: ACTIVE | COMMUNITY

## 2024-01-08 NOTE — HPI-ULCERATIVE COLITIS
Diagnosed after giving birth in  with severe colitis  Last Colonoscopy: 2022 with minimal erythema in ascending colon, cryptitis in that limited area on path the rest of the colon was normal  Current IBD Meds: Humira biweekly for the past month, was briefly on weekly.  Prior IBD Meds: Stelara q4  weeks with reinduction dose. prednisone and mesalamine  Steroid use/response: Responded to uceris foam. Was on prednisone for 6 weeks in . Extended course of steroids in  after hospitalization  Most recent imaging: none  Extraintestinal manifestations: none  Inflammatory Markers: 2023 calprotectin  on biweekly humira 1070, 2023 calprotectin 530  Required  biologic testin2022  Risk factors for severe disease: young age at diagnosis, severe disease at diagnosis, extreme calpro/crp elevations at diagnosis   IBD surgical history: none  Personal history of cancer: none   Cardiac history: none

## 2024-01-08 NOTE — HPI-TODAY'S VISIT:
Now back to biweekly humira injections. Insurance denied weekly dosing. She did not respond to biweekly dosing. She did receive weekly injections via samples for 2 months which improved her symptoms. 1-2 bm per day, no bleeding or mucous. No urgency. No nausea or vomiting. She does report joint pains that last an entire day once a week. This seems to happen when due for her humira dose. No longer using uceris foam. She forgot to do calprotectin  2nd child born may 17th, flare started around 30 weeks gestation. First flare started after delivering first child. Tried reinduction of stelara with no improvement, was on prednisone for a short time. 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

## 2024-01-24 ENCOUNTER — TELEPHONE ENCOUNTER (OUTPATIENT)
Dept: URBAN - METROPOLITAN AREA CLINIC 63 | Facility: CLINIC | Age: 29
End: 2024-01-24

## 2024-01-24 RX ORDER — ADALIMUMAB 40MG/0.4ML
INJECT 0.4 ML (1 PEN) INTO THE SKIN EVERY 7DAYS KIT SUBCUTANEOUS WEEKLY
Qty: 4 | Refills: 5
End: 2024-07-22

## 2024-04-11 ENCOUNTER — OV EP (OUTPATIENT)
Dept: URBAN - METROPOLITAN AREA CLINIC 60 | Facility: CLINIC | Age: 29
End: 2024-04-11

## 2024-05-07 ENCOUNTER — DASHBOARD ENCOUNTERS (OUTPATIENT)
Age: 29
End: 2024-05-07

## 2024-05-07 ENCOUNTER — OFFICE VISIT (OUTPATIENT)
Dept: URBAN - METROPOLITAN AREA CLINIC 63 | Facility: CLINIC | Age: 29
End: 2024-05-07
Payer: COMMERCIAL

## 2024-05-07 VITALS
BODY MASS INDEX: 17.66 KG/M2 | HEIGHT: 65 IN | DIASTOLIC BLOOD PRESSURE: 68 MMHG | SYSTOLIC BLOOD PRESSURE: 102 MMHG | OXYGEN SATURATION: 98 % | WEIGHT: 106 LBS | HEART RATE: 77 BPM | TEMPERATURE: 97.9 F

## 2024-05-07 DIAGNOSIS — M07.60 ENTEROPATHIC ARTHROPATHIES, UNSPECIFIED SITE: ICD-10-CM

## 2024-05-07 DIAGNOSIS — K63.9 DISEASE OF INTESTINE, UNSPECIFIED: ICD-10-CM

## 2024-05-07 DIAGNOSIS — K51.011 ULCERATIVE PANCOLITIS WITH RECTAL BLEEDING: ICD-10-CM

## 2024-05-07 PROCEDURE — 99214 OFFICE O/P EST MOD 30 MIN: CPT | Performed by: PHYSICIAN ASSISTANT

## 2024-05-07 RX ORDER — CITALOPRAM HYDROBROMIDE 10 MG/1
TAKE TWO TABLETS BY MOUTH ONE TIME DAILY TABLET ORAL
Qty: 60 UNSPECIFIED | Refills: 2 | Status: ACTIVE | COMMUNITY

## 2024-05-07 RX ORDER — ADALIMUMAB 40MG/0.4ML
INJECT 0.4 ML (1 PEN) INTO THE SKIN EVERY 7DAYS KIT SUBCUTANEOUS WEEKLY
Qty: 4 | Refills: 5 | Status: ACTIVE | COMMUNITY
End: 2024-07-22

## 2024-05-07 RX ORDER — ESCITALOPRAM OXALATE 10 MG/1
1 TABLET TABLET, FILM COATED ORAL ONCE A DAY
Status: ACTIVE | COMMUNITY

## 2024-05-20 ENCOUNTER — TELEPHONE ENCOUNTER (OUTPATIENT)
Dept: URBAN - METROPOLITAN AREA CLINIC 63 | Facility: CLINIC | Age: 29
End: 2024-05-20

## 2024-05-20 ENCOUNTER — WEB ENCOUNTER (OUTPATIENT)
Dept: URBAN - METROPOLITAN AREA CLINIC 63 | Facility: CLINIC | Age: 29
End: 2024-05-20